# Patient Record
Sex: FEMALE | Race: WHITE | NOT HISPANIC OR LATINO | URBAN - METROPOLITAN AREA
[De-identification: names, ages, dates, MRNs, and addresses within clinical notes are randomized per-mention and may not be internally consistent; named-entity substitution may affect disease eponyms.]

---

## 2018-06-19 ENCOUNTER — AMBULATORY SURGICAL CENTER (OUTPATIENT)
Dept: URBAN - METROPOLITAN AREA SURGERY 1 | Facility: SURGERY | Age: 53
End: 2018-06-19

## 2018-06-19 ENCOUNTER — OFFICE (OUTPATIENT)
Dept: URBAN - METROPOLITAN AREA PATHOLOGY 22 | Facility: PATHOLOGY | Age: 53
End: 2018-06-19

## 2018-06-19 VITALS
HEART RATE: 90 BPM | WEIGHT: 185 LBS | DIASTOLIC BLOOD PRESSURE: 85 MMHG | TEMPERATURE: 97.2 F | OXYGEN SATURATION: 92 % | OXYGEN SATURATION: 93 % | TEMPERATURE: 98.2 F | DIASTOLIC BLOOD PRESSURE: 85 MMHG | SYSTOLIC BLOOD PRESSURE: 109 MMHG | OXYGEN SATURATION: 95 % | SYSTOLIC BLOOD PRESSURE: 116 MMHG | RESPIRATION RATE: 16 BRPM | DIASTOLIC BLOOD PRESSURE: 80 MMHG | RESPIRATION RATE: 19 BRPM | HEART RATE: 97 BPM | RESPIRATION RATE: 20 BRPM | DIASTOLIC BLOOD PRESSURE: 76 MMHG | OXYGEN SATURATION: 93 % | SYSTOLIC BLOOD PRESSURE: 116 MMHG | HEIGHT: 62 IN | RESPIRATION RATE: 18 BRPM | RESPIRATION RATE: 16 BRPM | OXYGEN SATURATION: 95 % | DIASTOLIC BLOOD PRESSURE: 63 MMHG | HEART RATE: 88 BPM | HEIGHT: 62 IN | HEART RATE: 93 BPM | DIASTOLIC BLOOD PRESSURE: 76 MMHG | HEART RATE: 87 BPM | RESPIRATION RATE: 18 BRPM | TEMPERATURE: 97.2 F | RESPIRATION RATE: 20 BRPM | SYSTOLIC BLOOD PRESSURE: 122 MMHG | DIASTOLIC BLOOD PRESSURE: 74 MMHG | HEART RATE: 88 BPM | HEART RATE: 90 BPM | SYSTOLIC BLOOD PRESSURE: 99 MMHG | HEART RATE: 97 BPM | TEMPERATURE: 98.2 F | SYSTOLIC BLOOD PRESSURE: 114 MMHG | HEART RATE: 87 BPM | DIASTOLIC BLOOD PRESSURE: 74 MMHG | HEART RATE: 93 BPM | SYSTOLIC BLOOD PRESSURE: 122 MMHG | SYSTOLIC BLOOD PRESSURE: 109 MMHG | RESPIRATION RATE: 19 BRPM | DIASTOLIC BLOOD PRESSURE: 80 MMHG | DIASTOLIC BLOOD PRESSURE: 63 MMHG | SYSTOLIC BLOOD PRESSURE: 114 MMHG | OXYGEN SATURATION: 92 % | SYSTOLIC BLOOD PRESSURE: 99 MMHG | WEIGHT: 185 LBS

## 2018-06-19 DIAGNOSIS — K21.0 GASTRO-ESOPHAGEAL REFLUX DISEASE WITH ESOPHAGITIS: ICD-10-CM

## 2018-06-19 DIAGNOSIS — K22.70 BARRETT'S ESOPHAGUS WITHOUT DYSPLASIA: ICD-10-CM

## 2018-06-19 PROBLEM — B37.81 CANDIDAL ESOPHAGITIS: Status: ACTIVE | Noted: 2018-06-19

## 2018-06-19 PROCEDURE — 43239 EGD BIOPSY SINGLE/MULTIPLE: CPT | Performed by: INTERNAL MEDICINE

## 2018-06-19 PROCEDURE — 88305 TISSUE EXAM BY PATHOLOGIST: CPT | Performed by: INTERNAL MEDICINE

## 2022-01-24 ENCOUNTER — AMBULATORY SURGICAL CENTER (OUTPATIENT)
Dept: URBAN - METROPOLITAN AREA SURGERY 1 | Facility: SURGERY | Age: 57
End: 2022-01-24

## 2022-01-24 ENCOUNTER — OFFICE (OUTPATIENT)
Dept: URBAN - METROPOLITAN AREA PATHOLOGY 22 | Facility: PATHOLOGY | Age: 57
End: 2022-01-24

## 2022-01-24 VITALS
DIASTOLIC BLOOD PRESSURE: 91 MMHG | TEMPERATURE: 98 F | SYSTOLIC BLOOD PRESSURE: 117 MMHG | HEART RATE: 99 BPM | HEIGHT: 62 IN | SYSTOLIC BLOOD PRESSURE: 110 MMHG | OXYGEN SATURATION: 95 % | HEART RATE: 99 BPM | RESPIRATION RATE: 17 BRPM | HEART RATE: 96 BPM | DIASTOLIC BLOOD PRESSURE: 68 MMHG | DIASTOLIC BLOOD PRESSURE: 68 MMHG | TEMPERATURE: 97.5 F | HEART RATE: 100 BPM | RESPIRATION RATE: 16 BRPM | SYSTOLIC BLOOD PRESSURE: 110 MMHG | SYSTOLIC BLOOD PRESSURE: 110 MMHG | HEART RATE: 100 BPM | OXYGEN SATURATION: 97 % | DIASTOLIC BLOOD PRESSURE: 72 MMHG | TEMPERATURE: 98 F | RESPIRATION RATE: 17 BRPM | RESPIRATION RATE: 15 BRPM | HEART RATE: 96 BPM | DIASTOLIC BLOOD PRESSURE: 69 MMHG | SYSTOLIC BLOOD PRESSURE: 134 MMHG | TEMPERATURE: 97.5 F | RESPIRATION RATE: 15 BRPM | HEIGHT: 62 IN | WEIGHT: 190 LBS | SYSTOLIC BLOOD PRESSURE: 117 MMHG | SYSTOLIC BLOOD PRESSURE: 134 MMHG | DIASTOLIC BLOOD PRESSURE: 72 MMHG | DIASTOLIC BLOOD PRESSURE: 69 MMHG | TEMPERATURE: 97.5 F | WEIGHT: 190 LBS | SYSTOLIC BLOOD PRESSURE: 102 MMHG | SYSTOLIC BLOOD PRESSURE: 102 MMHG | SYSTOLIC BLOOD PRESSURE: 117 MMHG | HEART RATE: 96 BPM | OXYGEN SATURATION: 95 % | HEART RATE: 100 BPM | SYSTOLIC BLOOD PRESSURE: 99 MMHG | WEIGHT: 190 LBS | RESPIRATION RATE: 16 BRPM | HEIGHT: 62 IN | DIASTOLIC BLOOD PRESSURE: 51 MMHG | HEART RATE: 103 BPM | SYSTOLIC BLOOD PRESSURE: 134 MMHG | DIASTOLIC BLOOD PRESSURE: 91 MMHG | OXYGEN SATURATION: 97 % | SYSTOLIC BLOOD PRESSURE: 99 MMHG | HEART RATE: 103 BPM | SYSTOLIC BLOOD PRESSURE: 99 MMHG | RESPIRATION RATE: 16 BRPM | DIASTOLIC BLOOD PRESSURE: 69 MMHG | TEMPERATURE: 98 F | HEART RATE: 103 BPM | RESPIRATION RATE: 17 BRPM | DIASTOLIC BLOOD PRESSURE: 51 MMHG | OXYGEN SATURATION: 97 % | DIASTOLIC BLOOD PRESSURE: 72 MMHG | DIASTOLIC BLOOD PRESSURE: 91 MMHG | DIASTOLIC BLOOD PRESSURE: 51 MMHG | OXYGEN SATURATION: 95 % | DIASTOLIC BLOOD PRESSURE: 68 MMHG | HEART RATE: 99 BPM | SYSTOLIC BLOOD PRESSURE: 102 MMHG | RESPIRATION RATE: 15 BRPM

## 2022-01-24 DIAGNOSIS — Z86.010 PERSONAL HISTORY OF COLONIC POLYPS: ICD-10-CM

## 2022-01-24 DIAGNOSIS — D12.5 BENIGN NEOPLASM OF SIGMOID COLON: ICD-10-CM

## 2022-01-24 DIAGNOSIS — K22.70 BARRETT'S ESOPHAGUS WITHOUT DYSPLASIA: ICD-10-CM

## 2022-01-24 DIAGNOSIS — K44.9 DIAPHRAGMATIC HERNIA WITHOUT OBSTRUCTION OR GANGRENE: ICD-10-CM

## 2022-01-24 DIAGNOSIS — K64.1 SECOND DEGREE HEMORRHOIDS: ICD-10-CM

## 2022-01-24 DIAGNOSIS — K21.9 GASTRO-ESOPHAGEAL REFLUX DISEASE WITHOUT ESOPHAGITIS: ICD-10-CM

## 2022-01-24 PROBLEM — K63.5 POLYP OF COLON: Status: ACTIVE | Noted: 2022-01-24

## 2022-01-24 PROCEDURE — 43239 EGD BIOPSY SINGLE/MULTIPLE: CPT | Mod: 51 | Performed by: INTERNAL MEDICINE

## 2022-01-24 PROCEDURE — 45385 COLONOSCOPY W/LESION REMOVAL: CPT | Performed by: INTERNAL MEDICINE

## 2022-01-24 PROCEDURE — 88305 TISSUE EXAM BY PATHOLOGIST: CPT | Performed by: STUDENT IN AN ORGANIZED HEALTH CARE EDUCATION/TRAINING PROGRAM

## 2022-01-24 RX ORDER — ESOMEPRAZOLE MAGNESIUM 40 MG/1
CAPSULE, DELAYED RELEASE ORAL
Qty: 90 | Refills: 3 | Status: COMPLETED
Start: 2022-01-24 | End: 2022-03-24

## 2022-02-08 PROBLEM — K31.89 OTHER DISEASES OF STOMACH AND DUODENUM: Status: ACTIVE | Noted: 2022-01-24

## 2022-03-24 ENCOUNTER — OFFICE (OUTPATIENT)
Dept: URBAN - METROPOLITAN AREA CLINIC 10 | Facility: CLINIC | Age: 57
End: 2022-03-24

## 2022-03-24 VITALS
SYSTOLIC BLOOD PRESSURE: 113 MMHG | HEIGHT: 62 IN | OXYGEN SATURATION: 99 % | HEART RATE: 85 BPM | DIASTOLIC BLOOD PRESSURE: 80 MMHG | WEIGHT: 194 LBS

## 2022-03-24 DIAGNOSIS — R74.8 ABNORMAL LEVELS OF OTHER SERUM ENZYMES: ICD-10-CM

## 2022-03-24 DIAGNOSIS — K44.9 DIAPHRAGMATIC HERNIA WITHOUT OBSTRUCTION OR GANGRENE: ICD-10-CM

## 2022-03-24 DIAGNOSIS — E55.9 VITAMIN D DEFICIENCY, UNSPECIFIED: ICD-10-CM

## 2022-03-24 DIAGNOSIS — M81.0 AGE-RELATED OSTEOPOROSIS WITHOUT CURRENT PATHOLOGICAL FRACTU: ICD-10-CM

## 2022-03-24 DIAGNOSIS — E11.9 TYPE 2 DIABETES MELLITUS WITHOUT COMPLICATIONS: ICD-10-CM

## 2022-03-24 DIAGNOSIS — K21.9 GASTRO-ESOPHAGEAL REFLUX DISEASE WITHOUT ESOPHAGITIS: ICD-10-CM

## 2022-03-24 DIAGNOSIS — K22.70 BARRETT'S ESOPHAGUS WITHOUT DYSPLASIA: ICD-10-CM

## 2022-03-24 LAB
ANTINUCLEAR ANTIBODIES, IFA: NEGATIVE
C-REACTIVE PROTEIN, QUANT: 2 MG/L (ref 0–10)
GGT: 105 IU/L — HIGH (ref 0–60)
HEMOGLOBIN A1C: 6.1 % — HIGH (ref 4.8–5.6)
HEPATIC FUNCTION PANEL (7): ALBUMIN: 4.5 G/DL (ref 3.8–4.9)
HEPATIC FUNCTION PANEL (7): ALKALINE PHOSPHATASE: 179 IU/L — HIGH (ref 44–121)
HEPATIC FUNCTION PANEL (7): ALT (SGPT): 21 IU/L (ref 0–32)
HEPATIC FUNCTION PANEL (7): AST (SGOT): 19 IU/L (ref 0–40)
HEPATIC FUNCTION PANEL (7): BILIRUBIN, DIRECT: 0.14 MG/DL (ref 0–0.4)
HEPATIC FUNCTION PANEL (7): BILIRUBIN, TOTAL: 0.5 MG/DL (ref 0–1.2)
HEPATIC FUNCTION PANEL (7): PROTEIN, TOTAL: 6.7 G/DL (ref 6–8.5)
MITOCHONDRIAL (M2) ANTIBODY: <20 UNITS
SEDIMENTATION RATE-WESTERGREN: 15 MM/HR (ref 0–40)

## 2022-03-24 PROCEDURE — 99214 OFFICE O/P EST MOD 30 MIN: CPT | Performed by: INTERNAL MEDICINE

## 2022-03-24 NOTE — SERVICEHPINOTES
Pt  is here  for  fatty liver  follow up. Pt has type 2 diabetes  and  takes Ozempic   . Pt  has elevated Alk. pjhos, ALT,  Brady's  esopjhagus  with no dysphagia was  found on EGD Bx. [3 grav, 3 para. ROS Wording]